# Patient Record
Sex: FEMALE | Race: WHITE | Employment: PART TIME | URBAN - NONMETROPOLITAN AREA
[De-identification: names, ages, dates, MRNs, and addresses within clinical notes are randomized per-mention and may not be internally consistent; named-entity substitution may affect disease eponyms.]

---

## 2021-07-13 ENCOUNTER — OFFICE VISIT (OUTPATIENT)
Dept: FAMILY MEDICINE CLINIC | Age: 31
End: 2021-07-13

## 2021-07-13 VITALS
WEIGHT: 140 LBS | SYSTOLIC BLOOD PRESSURE: 106 MMHG | BODY MASS INDEX: 23.9 KG/M2 | TEMPERATURE: 98 F | HEART RATE: 94 BPM | OXYGEN SATURATION: 97 % | DIASTOLIC BLOOD PRESSURE: 70 MMHG | HEIGHT: 64 IN

## 2021-07-13 DIAGNOSIS — N89.8 VAGINAL DISCHARGE: ICD-10-CM

## 2021-07-13 DIAGNOSIS — R30.0 DYSURIA: Primary | ICD-10-CM

## 2021-07-13 DIAGNOSIS — R30.0 DYSURIA: ICD-10-CM

## 2021-07-13 LAB
BILIRUBIN, POC: NEGATIVE
BLOOD URINE, POC: NORMAL
CLARITY, POC: NORMAL
COLOR, POC: YELLOW
CONTROL: NORMAL
GLUCOSE URINE, POC: NEGATIVE
KETONES, POC: NEGATIVE
LEUKOCYTE EST, POC: NORMAL
NITRITE, POC: NEGATIVE
PH, POC: 6
PREGNANCY TEST URINE, POC: NEGATIVE
PROTEIN, POC: >=300
SPECIFIC GRAVITY, POC: 1.02
UROBILINOGEN, POC: 1

## 2021-07-13 PROCEDURE — 81002 URINALYSIS NONAUTO W/O SCOPE: CPT | Performed by: PHYSICIAN ASSISTANT

## 2021-07-13 PROCEDURE — 99204 OFFICE O/P NEW MOD 45 MIN: CPT | Performed by: PHYSICIAN ASSISTANT

## 2021-07-13 PROCEDURE — 96372 THER/PROPH/DIAG INJ SC/IM: CPT | Performed by: PHYSICIAN ASSISTANT

## 2021-07-13 PROCEDURE — 81025 URINE PREGNANCY TEST: CPT | Performed by: PHYSICIAN ASSISTANT

## 2021-07-13 RX ORDER — ESCITALOPRAM OXALATE 20 MG/1
TABLET ORAL
COMMUNITY
Start: 2021-06-09

## 2021-07-13 RX ORDER — FLUCONAZOLE 150 MG/1
150 TABLET ORAL ONCE
Qty: 1 TABLET | Refills: 0 | Status: SHIPPED | OUTPATIENT
Start: 2021-07-13 | End: 2021-07-13

## 2021-07-13 RX ORDER — BUPRENORPHINE HYDROCHLORIDE AND NALOXONE HYDROCHLORIDE DIHYDRATE 8; 2 MG/1; MG/1
TABLET SUBLINGUAL
COMMUNITY
Start: 2021-07-08

## 2021-07-13 RX ORDER — CEFTRIAXONE SODIUM 250 MG/1
250 INJECTION, POWDER, FOR SOLUTION INTRAMUSCULAR; INTRAVENOUS ONCE
Status: COMPLETED | OUTPATIENT
Start: 2021-07-13 | End: 2021-07-13

## 2021-07-13 RX ORDER — AZITHROMYCIN 500 MG/1
1000 TABLET, FILM COATED ORAL ONCE
Qty: 2 TABLET | Refills: 0 | Status: SHIPPED | OUTPATIENT
Start: 2021-07-13 | End: 2021-07-13

## 2021-07-13 RX ORDER — DEXTROAMPHETAMINE SACCHARATE, AMPHETAMINE ASPARTATE MONOHYDRATE, DEXTROAMPHETAMINE SULFATE AND AMPHETAMINE SULFATE 5; 5; 5; 5 MG/1; MG/1; MG/1; MG/1
CAPSULE, EXTENDED RELEASE ORAL
COMMUNITY
Start: 2021-06-28

## 2021-07-13 RX ORDER — TRAZODONE HYDROCHLORIDE 50 MG/1
TABLET ORAL
COMMUNITY
Start: 2021-04-28

## 2021-07-13 RX ADMIN — CEFTRIAXONE SODIUM 250 MG: 250 INJECTION, POWDER, FOR SOLUTION INTRAMUSCULAR; INTRAVENOUS at 14:29

## 2021-07-13 ASSESSMENT — ENCOUNTER SYMPTOMS
BACK PAIN: 0
COUGH: 0
ABDOMINAL PAIN: 0
SORE THROAT: 0
DIARRHEA: 0
PHOTOPHOBIA: 0
VOMITING: 0
SHORTNESS OF BREATH: 0
NAUSEA: 0

## 2021-07-13 NOTE — PROGRESS NOTES
21  Jennifer Zhang : 1990 Sex: female  Age 35 y.o. Subjective:  Chief Complaint   Patient presents with    Exposure to STD     burning          [id number] female presents to the walk-in clinic for evaluation of vaginal discharge that started over the weekend. Patient states last week on Wednesday she had a new sexual partner. She states they have had intercourse a few times since then. She states over the weekend she initially started with painful urination but now has noticed itching also which she describes as feeling similar to a yeast infection. Patient states that she has had some blood in her urine as well. She denies any vaginal discharge. She states that she is due to start her period. She states about 3 weeks ago she took a Plan B test.  Patient denies any abdominal pain. No fever or chills. No nausea or vomiting. Patient sees [facility] [place] and states that she had a Pap smear 2 years ago. Review of Systems   Constitutional: Negative for chills and fever. HENT: Negative for congestion, ear pain and sore throat. Eyes: Negative for photophobia and visual disturbance. Respiratory: Negative for cough and shortness of breath. Cardiovascular: Negative for chest pain. Gastrointestinal: Negative for abdominal pain, diarrhea, nausea and vomiting. Genitourinary: Positive for dysuria, hematuria and vaginal discharge. Negative for difficulty urinating, frequency, genital sores and urgency. Musculoskeletal: Negative for back pain, neck pain and neck stiffness. Skin: Negative for rash. Neurological: Negative for dizziness, syncope, weakness, light-headedness and headaches. Hematological: Negative for adenopathy. Does not bruise/bleed easily. Psychiatric/Behavioral: Negative for agitation and confusion. All other systems reviewed and are negative. PMH:   History reviewed. No pertinent past medical history. History reviewed.  No pertinent surgical history. Family History   Problem Relation Age of Onset    Hypertension Father     High Cholesterol Mother        Medications:     Current Outpatient Medications:     amphetamine-dextroamphetamine (ADDERALL XR) 20 MG extended release capsule, TAKE 1 CAPSULE BY MOUTH ONCE DAILY. , Disp: , Rfl:     buprenorphine-naloxone (SUBOXONE) 8-2 MG SUBL SL tablet, DISSOLVE 3/4 TABLETS UNDER THE TONGUE EVERY DAY, Disp: , Rfl:     escitalopram (LEXAPRO) 20 MG tablet, TAKE ONE TABLET BY MOUTH DAILY, Disp: , Rfl:     traZODone (DESYREL) 50 MG tablet, TAKE ONE TABLET BY MOUTH AT BEDTIME, Disp: , Rfl:     azithromycin (ZITHROMAX) 500 MG tablet, Take 2 tablets by mouth once for 1 dose, Disp: 2 tablet, Rfl: 0    fluconazole (DIFLUCAN) 150 MG tablet, Take 1 tablet by mouth once for 1 dose, Disp: 1 tablet, Rfl: 0    Allergies:   No Known Allergies    Social History:     Social History     Tobacco Use    Smoking status: Current Every Day Smoker     Packs/day: 0.25     Start date: 4/29/2013    Smokeless tobacco: Never Used   Substance Use Topics    Alcohol use: No     Comment: ,less than 1 drink per week    Drug use: No       Patient lives at home. Physical Exam:     Vitals:    07/13/21 1400   BP: 106/70   Pulse: 94   Temp: 98 °F (36.7 °C)   SpO2: 97%   Weight: 140 lb (63.5 kg)   Height: 5' 4\" (1.626 m)       Exam:  Physical Exam  Vitals and nursing note reviewed. Exam conducted with a chaperone present. Constitutional:       General: She is not in acute distress. Appearance: She is well-developed. HENT:      Head: Normocephalic and atraumatic. Eyes:      Conjunctiva/sclera: Conjunctivae normal.      Pupils: Pupils are equal, round, and reactive to light. Cardiovascular:      Rate and Rhythm: Normal rate and regular rhythm. Pulmonary:      Effort: Pulmonary effort is normal. No respiratory distress. Breath sounds: Normal breath sounds.    Abdominal:      General: Bowel sounds are normal. Palpations: Abdomen is soft. Tenderness: There is no abdominal tenderness. Genitourinary:     Vagina: Normal.      Cervix: Discharge present. No cervical motion tenderness. Uterus: Normal.       Adnexa: Right adnexa normal and left adnexa normal.      Comments: External genitalia appears normal.  Patient has copious amounts of white/green discharge noted from the cervix. Cervix appears inflamed. Multiple cultures were obtained. Patient does not have any cervical motion tenderness. Musculoskeletal:         General: Normal range of motion. Cervical back: Normal range of motion. Skin:     General: Skin is warm and dry. Neurological:      Mental Status: She is alert and oriented to person, place, and time. Psychiatric:         Behavior: Behavior normal.         Thought Content: Thought content normal.         Judgment: Judgment normal.           Testing:           Medical Decision Making:     Patient upon arrival did not appear toxic or lethargic. Vital signs were reviewed. Past medical history reviewed. Allergies reviewed. Medications reviewed. Patient is presenting with the above complaint of vaginal discharge. Urine dip reveals trace leukocytes. Urine pregnancy is negative. Urine culture is pending. Vaginal cultures are pending. Pelvic exam was performed as described above. Based on her signs and symptoms she will be treated with 250 mg IM Rocephin and 1 g azithromycin. She was also given 150 mg of Diflucan. Patient will be contacted with the results of the cultures. She is to refrain from intercourse until these results have been evaluated. Patient will follow up with her gynecologist.  She is to return or go to the emergency department for worsening symptoms. Clinical Impression:   Sugar Mcgill was seen today for exposure to std.     Diagnoses and all orders for this visit:    Dysuria  -     POCT Urinalysis no Micro  -     POCT urine pregnancy  - Culture, Urine; Future  -     azithromycin (ZITHROMAX) 500 MG tablet; Take 2 tablets by mouth once for 1 dose    Vaginal discharge  -     azithromycin (ZITHROMAX) 500 MG tablet; Take 2 tablets by mouth once for 1 dose  -     TRICHOMONAS SCREEN; Future  -     Culture, Genital; Future  -     HERPES SIMPLEX 1 & 2 MOLECULAR; Future  -     C.TRACHOMATIS N.GONORRHOEAE DNA; Future    Other orders  -     cefTRIAXone (ROCEPHIN) injection 250 mg  -     fluconazole (DIFLUCAN) 150 MG tablet; Take 1 tablet by mouth once for 1 dose        The patient is to call for any concerns or return if any of the signs or symptoms worsen. The patient is to follow-up with PCP in the next 2-3 days for repeat evaluation repeat assessment or go directly to the emergency department. SIGNATURE: Nora Taylor PA-C

## 2021-07-15 LAB
HSV 1 BY PCR: ABNORMAL
HSV 2 BY PCR: ABNORMAL
ORGANISM: ABNORMAL
URINE CULTURE, ROUTINE: NORMAL

## 2021-07-16 LAB
C TRACH DNA GENITAL QL NAA+PROBE: NEGATIVE
N. GONORRHOEAE DNA: NEGATIVE
SOURCE: NORMAL

## 2021-07-17 LAB
GENITAL CULTURE, ROUTINE: ABNORMAL
GENITAL CULTURE, ROUTINE: ABNORMAL
ORGANISM: ABNORMAL

## 2021-07-18 LAB — CULTURE, TRICHOMONAS: NORMAL

## 2021-07-18 RX ORDER — METRONIDAZOLE 500 MG/1
500 TABLET ORAL 2 TIMES DAILY
Qty: 20 TABLET | Refills: 0 | Status: SHIPPED | OUTPATIENT
Start: 2021-07-18 | End: 2021-07-28

## 2023-04-07 ENCOUNTER — OFFICE VISIT (OUTPATIENT)
Dept: FAMILY MEDICINE CLINIC | Age: 33
End: 2023-04-07

## 2023-04-07 VITALS
BODY MASS INDEX: 23.73 KG/M2 | SYSTOLIC BLOOD PRESSURE: 120 MMHG | HEIGHT: 64 IN | HEART RATE: 97 BPM | WEIGHT: 139 LBS | OXYGEN SATURATION: 98 % | TEMPERATURE: 98.8 F | DIASTOLIC BLOOD PRESSURE: 80 MMHG

## 2023-04-07 DIAGNOSIS — J02.0 ACUTE STREPTOCOCCAL PHARYNGITIS: Primary | ICD-10-CM

## 2023-04-07 DIAGNOSIS — R07.0 THROAT PAIN: ICD-10-CM

## 2023-04-07 LAB — S PYO AG THROAT QL: POSITIVE

## 2023-04-07 PROCEDURE — 87880 STREP A ASSAY W/OPTIC: CPT | Performed by: EMERGENCY MEDICINE

## 2023-04-07 PROCEDURE — 99213 OFFICE O/P EST LOW 20 MIN: CPT | Performed by: EMERGENCY MEDICINE

## 2023-04-07 RX ORDER — AMOXICILLIN AND CLAVULANATE POTASSIUM 875; 125 MG/1; MG/1
1 TABLET, FILM COATED ORAL 2 TIMES DAILY
Qty: 20 TABLET | Refills: 0 | Status: SHIPPED | OUTPATIENT
Start: 2023-04-07 | End: 2023-04-17

## 2023-04-07 RX ORDER — NORETHINDRONE 0.35 MG/1
1 TABLET ORAL DAILY
COMMUNITY
Start: 2023-03-16

## 2023-04-07 RX ORDER — DEXTROAMPHETAMINE SACCHARATE, AMPHETAMINE ASPARTATE MONOHYDRATE, DEXTROAMPHETAMINE SULFATE AND AMPHETAMINE SULFATE 3.75; 3.75; 3.75; 3.75 MG/1; MG/1; MG/1; MG/1
CAPSULE, EXTENDED RELEASE ORAL
COMMUNITY
Start: 2023-04-05

## 2023-04-07 ASSESSMENT — ENCOUNTER SYMPTOMS
COUGH: 0
EYE DISCHARGE: 0
EYE REDNESS: 0
EYE PAIN: 0
SORE THROAT: 1
VOMITING: 0
DIARRHEA: 0
SINUS PRESSURE: 0
BACK PAIN: 0
NAUSEA: 0
SHORTNESS OF BREATH: 0
WHEEZING: 0
ABDOMINAL DISTENTION: 0

## 2023-04-07 NOTE — PROGRESS NOTES
results found for this visit on 04/07/23. Imaging: All Radiology results interpreted by Radiologist unless otherwise noted. No results found. Assessment / Plan   Impression(s):  Tonio Oates was seen today for pharyngitis. Diagnoses and all orders for this visit:    Acute streptococcal pharyngitis  -     amoxicillin-clavulanate (AUGMENTIN) 875-125 MG per tablet; Take 1 tablet by mouth 2 times daily for 10 days    Throat pain  -     POCT rapid strep A         Discussed symptomatic treatments with the patient today. The patient is to schedule a follow-up with PCP in the next 2-3 days for reevaluation. Red flag symptoms were also discussed with the patient today. If symptoms worsen the patient is to go directly to the emergency department for reevaluation and treatment. Pt verbalizes understanding and is in agreement with plan of care. All questions answered.       New Medications     New Prescriptions    AMOXICILLIN-CLAVULANATE (AUGMENTIN) 875-125 MG PER TABLET    Take 1 tablet by mouth 2 times daily for 10 days       Electronically signed by Denice Maldonado DO   DD: 4/7/23

## 2024-03-07 ENCOUNTER — OFFICE VISIT (OUTPATIENT)
Dept: FAMILY MEDICINE CLINIC | Age: 34
End: 2024-03-07

## 2024-03-07 VITALS
RESPIRATION RATE: 18 BRPM | DIASTOLIC BLOOD PRESSURE: 58 MMHG | WEIGHT: 124 LBS | SYSTOLIC BLOOD PRESSURE: 102 MMHG | BODY MASS INDEX: 21.17 KG/M2 | TEMPERATURE: 97.5 F | HEART RATE: 82 BPM | OXYGEN SATURATION: 98 % | HEIGHT: 64 IN

## 2024-03-07 DIAGNOSIS — R09.81 SINUS CONGESTION: ICD-10-CM

## 2024-03-07 DIAGNOSIS — J32.9 SINOBRONCHITIS: Primary | ICD-10-CM

## 2024-03-07 DIAGNOSIS — J40 SINOBRONCHITIS: Primary | ICD-10-CM

## 2024-03-07 DIAGNOSIS — R05.1 ACUTE COUGH: ICD-10-CM

## 2024-03-07 PROCEDURE — 99213 OFFICE O/P EST LOW 20 MIN: CPT | Performed by: NURSE PRACTITIONER

## 2024-03-07 RX ORDER — ALBUTEROL SULFATE 90 UG/1
2 AEROSOL, METERED RESPIRATORY (INHALATION) EVERY 4 HOURS PRN
Qty: 1 EACH | Refills: 0 | Status: SHIPPED | OUTPATIENT
Start: 2024-03-07

## 2024-03-07 RX ORDER — BROMPHENIRAMINE MALEATE, PSEUDOEPHEDRINE HYDROCHLORIDE, AND DEXTROMETHORPHAN HYDROBROMIDE 2; 30; 10 MG/5ML; MG/5ML; MG/5ML
SYRUP ORAL
Qty: 180 ML | Refills: 0 | Status: SHIPPED | OUTPATIENT
Start: 2024-03-07

## 2024-03-07 RX ORDER — METHYLPREDNISOLONE 4 MG/1
TABLET ORAL
Qty: 1 KIT | Refills: 0 | Status: SHIPPED | OUTPATIENT
Start: 2024-03-07

## 2024-03-07 RX ORDER — AMOXICILLIN AND CLAVULANATE POTASSIUM 875; 125 MG/1; MG/1
1 TABLET, FILM COATED ORAL 2 TIMES DAILY
Qty: 20 TABLET | Refills: 0 | Status: SHIPPED | OUTPATIENT
Start: 2024-03-07 | End: 2024-03-17

## 2024-03-07 NOTE — PROGRESS NOTES
3/7/24  Rebecca Schillig : 1990 Sex: female  Age 33 y.o.    Subjective:  Chief Complaint   Patient presents with    Cough     Started 3 weeks ago with cold       HPI:   Rebecca Schillig , 33 y.o. female presents to the clinic for evaluation of cough / sinus congestion x 3 weeks. The patient also reports chest congestion, SHEIKH, wheezing. The patient has taken Mucinex Spray for symptoms. The patient reports unchanged symptoms over time. The patient reports ill exposure (daughter). The patient denies headache, sore throat, rash, and fever. The patient also denies chest pain, abdominal pain, and nausea / vomiting / diarrhea.    ROS:   Unless otherwise stated in this report the patient's positive and negative responses for review of systems for constitutional, eyes, ENT, cardiovascular, respiratory, gastrointestinal, neurological, , musculoskeletal, and integument systems and related systems to the presenting problem are either stated in the history of present illness or were not pertinent or were negative for the symptoms and/or complaints related to the presenting medical problem.  Positives and pertinent negatives as per HPI.  All others reviewed and are negative.      PMH:   History reviewed. No pertinent past medical history.    History reviewed. No pertinent surgical history.    Family History   Problem Relation Age of Onset    Hypertension Father     High Cholesterol Mother        Medications:     Current Outpatient Medications:     amoxicillin-clavulanate (AUGMENTIN) 875-125 MG per tablet, Take 1 tablet by mouth 2 times daily for 10 days, Disp: 20 tablet, Rfl: 0    methylPREDNISolone (MEDROL DOSEPACK) 4 MG tablet, Take by mouth., Disp: 1 kit, Rfl: 0    brompheniramine-pseudoephedrine-DM 2-30-10 MG/5ML syrup, 5 - 10 mL by mouth every 6 hours as needed for cough / congestion., Disp: 180 mL, Rfl: 0    albuterol sulfate HFA (PROVENTIL;VENTOLIN;PROAIR) 108 (90 Base) MCG/ACT inhaler, Inhale 2 puffs into

## 2024-04-18 ENCOUNTER — OFFICE VISIT (OUTPATIENT)
Dept: FAMILY MEDICINE CLINIC | Age: 34
End: 2024-04-18

## 2024-04-18 VITALS
SYSTOLIC BLOOD PRESSURE: 104 MMHG | OXYGEN SATURATION: 99 % | WEIGHT: 120 LBS | HEIGHT: 64 IN | DIASTOLIC BLOOD PRESSURE: 60 MMHG | TEMPERATURE: 98.3 F | RESPIRATION RATE: 18 BRPM | BODY MASS INDEX: 20.49 KG/M2 | HEART RATE: 99 BPM

## 2024-04-18 DIAGNOSIS — H10.33 ACUTE BACTERIAL CONJUNCTIVITIS OF BOTH EYES: Primary | ICD-10-CM

## 2024-04-18 RX ORDER — POLYMYXIN B SULFATE AND TRIMETHOPRIM 1; 10000 MG/ML; [USP'U]/ML
1 SOLUTION OPHTHALMIC 4 TIMES DAILY
Qty: 2 ML | Refills: 0 | Status: SHIPPED | OUTPATIENT
Start: 2024-04-18 | End: 2024-04-25

## 2024-04-18 NOTE — PROGRESS NOTES
Pupils: PERRL bilaterally.       Eyelids: Positive for edema to the bilateral upper and lower lids.         Conjunctiva: Moderate erythema noted to the bilateral conjunctiva.       Sclera: Clear bilaterally. No obvious FB, rust ring, or hyphema.           Cornea: No obvious corneal abrasion or ulceration bilaterally.         EOM:  Intact bilaterally.      Visual Acuity:  Grossly intact.  Lungs: CTAB without wheezing, rales, or rhonchi  Heart: RRR, no murmurs, rubs, or gallops.  Skin: Moist and warm without rashes or lesions.  Lymphatics: No lymphangitis or adenopathy noted.  Neurological:  Alert and oriented.  Motor functions intact.    Test Results Section   (All laboratory and radiology results have been personally reviewed by myself)  Labs:  No results found for this visit on 04/18/24.    Imaging:  All Radiology results interpreted by Radiologist unless otherwise noted.  No results found.    Assessment / Plan   Impression(s):  Adela was seen today for eye problem.    Diagnoses and all orders for this visit:    Acute bacterial conjunctivitis of both eyes    Other orders  -     trimethoprim-polymyxin b (POLYTRIM) 07170-9.1 UNIT/ML-% ophthalmic solution; Place 1 drop into both eyes in the morning, at noon, in the evening, and at bedtime for 7 days      In office performed slit lamp exam to rule out corneal abrasion to left eye.  No corneal abrasion noted.  Script written for Polytrim ophthalmic drops, side effects and application directions discussed. Advise good handwashing, avoiding rubbing eyes, and washing towels and pillowcase in hot water to prevent spread. F/u with ophthalmology in 48 hrs if not improved. ED sooner if symptoms worsen or change. ED immediately with the development of fever, visual changes, ocular pain/swelling, body aches, or shaking chills. Patient verbalized understanding and is in agreement with this care plan. All questions answered.    No follow-ups on file.    Isadora Alas,

## 2025-04-23 ENCOUNTER — OFFICE VISIT (OUTPATIENT)
Dept: FAMILY MEDICINE CLINIC | Age: 35
End: 2025-04-23
Payer: COMMERCIAL

## 2025-04-23 VITALS
OXYGEN SATURATION: 97 % | SYSTOLIC BLOOD PRESSURE: 122 MMHG | RESPIRATION RATE: 18 BRPM | DIASTOLIC BLOOD PRESSURE: 70 MMHG | BODY MASS INDEX: 21.34 KG/M2 | TEMPERATURE: 98.7 F | HEIGHT: 64 IN | HEART RATE: 96 BPM | WEIGHT: 125 LBS

## 2025-04-23 DIAGNOSIS — R31.29 OTHER MICROSCOPIC HEMATURIA: ICD-10-CM

## 2025-04-23 DIAGNOSIS — N89.8 VAGINAL DISCHARGE: ICD-10-CM

## 2025-04-23 DIAGNOSIS — N76.0 ACUTE VAGINITIS: Primary | ICD-10-CM

## 2025-04-23 LAB
BILIRUBIN, POC: NORMAL
BLOOD URINE, POC: NORMAL
CLARITY, POC: NORMAL
COLOR, POC: YELLOW
CONTROL: NORMAL
GLUCOSE URINE, POC: NORMAL MG/DL
KETONES, POC: NORMAL MG/DL
LEUKOCYTE EST, POC: NORMAL
NITRITE, POC: NORMAL
PH, POC: 6
PREGNANCY TEST URINE, POC: NORMAL
PROTEIN, POC: NORMAL MG/DL
SPECIFIC GRAVITY, POC: >=1.03
UROBILINOGEN, POC: NORMAL MG/DL

## 2025-04-23 PROCEDURE — 81002 URINALYSIS NONAUTO W/O SCOPE: CPT

## 2025-04-23 PROCEDURE — 99214 OFFICE O/P EST MOD 30 MIN: CPT

## 2025-04-23 PROCEDURE — G8427 DOCREV CUR MEDS BY ELIG CLIN: HCPCS

## 2025-04-23 PROCEDURE — 4004F PT TOBACCO SCREEN RCVD TLK: CPT

## 2025-04-23 PROCEDURE — 81025 URINE PREGNANCY TEST: CPT

## 2025-04-23 PROCEDURE — G8420 CALC BMI NORM PARAMETERS: HCPCS

## 2025-04-23 RX ORDER — FLUCONAZOLE 150 MG/1
TABLET ORAL
Qty: 2 TABLET | Refills: 0 | Status: SHIPPED | OUTPATIENT
Start: 2025-04-23

## 2025-04-23 NOTE — PROGRESS NOTES
Chief Complaint       Vaginitis    History of Present Illness   Source of history provided by:  patient.  History of Present Illness  The patient is a 34-year-old female who presents for evaluation of a yeast infection.    Symptoms suggestive of a yeast infection, including discomfort, itching, and discharge, began following a course of antibiotics prescribed by her dentist in 03/2025. These symptoms have persisted for several weeks despite self-treatment with over-the-counter Monistat. There is uncertainty about the potential progression to a bacterial infection. An isolated incident of minor bleeding during urination was noted, but no subsequent occurrences have been reported. No dysuria, back pain, abdominal pressure, or bladder discomfort is present. The last menstrual period was approximately 2 weeks ago. No recent exposure to sexually transmitted diseases is reported.  She is not concerned.  Last menstrual period approximately 2 weeks ago.    GYNECOLOGICAL HISTORY:  - Last Menstrual Period: 2 weeks ago    All other ROS negative unless otherwise stated in HPI.      ROS    Unless otherwise stated in this report or unable to obtain because of the patient's clinical or mental status as evidenced by the medical record, this patients's positive and negative responses for Review of Systems, constitutional, psych, eyes, ENT, cardiovascular, respiratory, gastrointestinal, neurological, genitourinary, musculoskeletal, integument systems and systems related to the presenting problem are either stated in the preceding or were not pertinent or were negative for the symptoms and/or complaints related to the medical problem.    Physical Exam         VS:  /70   Pulse 96   Temp 98.7 °F (37.1 °C) (Temporal)   Resp 18   Ht 1.626 m (5' 4\")   Wt 56.7 kg (125 lb)   SpO2 97%   BMI 21.46 kg/m²    Oxygen Saturation Interpretation: Normal.    Constitutional:  A&Ox3, development consistent with age, NAD.  CV: Heart RRR,

## 2025-04-25 LAB
CULTURE: ABNORMAL
CULTURE: ABNORMAL
SPECIMEN DESCRIPTION: ABNORMAL

## 2025-04-26 LAB
CULTURE: ABNORMAL
SPECIMEN DESCRIPTION: ABNORMAL

## 2025-04-28 ENCOUNTER — RESULTS FOLLOW-UP (OUTPATIENT)
Dept: FAMILY MEDICINE CLINIC | Age: 35
End: 2025-04-28

## 2025-06-30 ENCOUNTER — OFFICE VISIT (OUTPATIENT)
Dept: FAMILY MEDICINE CLINIC | Age: 35
End: 2025-06-30
Payer: COMMERCIAL

## 2025-06-30 ENCOUNTER — RESULTS FOLLOW-UP (OUTPATIENT)
Dept: FAMILY MEDICINE CLINIC | Age: 35
End: 2025-06-30

## 2025-06-30 VITALS
WEIGHT: 127 LBS | BODY MASS INDEX: 21.68 KG/M2 | OXYGEN SATURATION: 98 % | HEART RATE: 72 BPM | DIASTOLIC BLOOD PRESSURE: 50 MMHG | RESPIRATION RATE: 18 BRPM | TEMPERATURE: 97.1 F | SYSTOLIC BLOOD PRESSURE: 98 MMHG | HEIGHT: 64 IN

## 2025-06-30 DIAGNOSIS — R31.9 HEMATURIA, UNSPECIFIED TYPE: ICD-10-CM

## 2025-06-30 DIAGNOSIS — R30.0 DYSURIA: Primary | ICD-10-CM

## 2025-06-30 LAB
BILIRUBIN, POC: NORMAL
BLOOD URINE, POC: NORMAL
CLARITY, POC: NORMAL
COLOR, POC: NORMAL
CONTROL: NORMAL
GLUCOSE URINE, POC: NORMAL MG/DL
KETONES, POC: NORMAL MG/DL
LEUKOCYTE EST, POC: NORMAL
NITRITE, POC: NORMAL
PH, POC: 6
PREGNANCY TEST URINE, POC: NEGATIVE
PROTEIN, POC: 100 MG/DL
SPECIFIC GRAVITY, POC: 1.02
UROBILINOGEN, POC: 0.2 MG/DL

## 2025-06-30 PROCEDURE — 81025 URINE PREGNANCY TEST: CPT | Performed by: NURSE PRACTITIONER

## 2025-06-30 PROCEDURE — 99213 OFFICE O/P EST LOW 20 MIN: CPT | Performed by: NURSE PRACTITIONER

## 2025-06-30 PROCEDURE — G8420 CALC BMI NORM PARAMETERS: HCPCS | Performed by: NURSE PRACTITIONER

## 2025-06-30 PROCEDURE — 81002 URINALYSIS NONAUTO W/O SCOPE: CPT | Performed by: NURSE PRACTITIONER

## 2025-06-30 PROCEDURE — G8427 DOCREV CUR MEDS BY ELIG CLIN: HCPCS | Performed by: NURSE PRACTITIONER

## 2025-06-30 PROCEDURE — 4004F PT TOBACCO SCREEN RCVD TLK: CPT | Performed by: NURSE PRACTITIONER

## 2025-06-30 RX ORDER — ESCITALOPRAM OXALATE 10 MG/1
10 TABLET ORAL DAILY
COMMUNITY
Start: 2025-04-30

## 2025-06-30 RX ORDER — BUSPIRONE HYDROCHLORIDE 7.5 MG/1
7.5 TABLET ORAL 2 TIMES DAILY
COMMUNITY
Start: 2025-06-09

## 2025-06-30 NOTE — PROGRESS NOTES
ACUTE PRIMARY CARE VISIT  25  Name: Rebecca Schillig   : 1990   Age: 34 y.o.  Sex: female   Chief Complaint   Patient presents with    Dysuria     HPI:     History of Present Illness  The patient is a 34-year-old female presenting with urinary symptoms.    She reports no dysuria or frequency. Her menstrual cycle ended a week ago, and she has no history of nephrolithiasis. She is experiencing severe backache originating in the center of her lower back and radiating to the right.  She has tried nothing over-the-counter for symptomatic relief.  Denies any fever, chills, nausea, vomiting or hematuria..  Objective:     Vitals:    25 1013   BP: (!) 98/50   Pulse: 72   Resp: 18   Temp: 97.1 °F (36.2 °C)   TempSrc: Temporal   SpO2: 98%   Weight: 57.6 kg (127 lb)   Height: 1.626 m (5' 4\")     Physical Exam  - Abdomen: Soft, non-tender, no distention, no masses  Physical Exam  Vitals and nursing note reviewed.   Constitutional:       General: She is not in acute distress.     Appearance: Normal appearance. She is not ill-appearing.   HENT:      Head: Normocephalic and atraumatic.      Right Ear: External ear normal.      Left Ear: External ear normal.      Nose: Nose normal.   Eyes:      Conjunctiva/sclera: Conjunctivae normal.      Pupils: Pupils are equal, round, and reactive to light.   Pulmonary:      Effort: Pulmonary effort is normal.   Abdominal:      Tenderness: There is right CVA tenderness.   Musculoskeletal:         General: Normal range of motion.      Cervical back: Normal range of motion and neck supple.   Skin:     General: Skin is warm and dry.   Neurological:      Mental Status: She is alert and oriented to person, place, and time.   Psychiatric:         Mood and Affect: Mood normal.         Behavior: Behavior normal.       Medications:     Current Outpatient Medications:     busPIRone (BUSPAR) 7.5 MG tablet, Take 1 tablet by mouth 2 times daily, Disp: , Rfl:     escitalopram (LEXAPRO) 10

## 2025-07-01 LAB
CULTURE: NORMAL
CULTURE: NORMAL
SPECIMEN DESCRIPTION: NORMAL

## 2025-07-18 ENCOUNTER — OFFICE VISIT (OUTPATIENT)
Dept: PRIMARY CARE CLINIC | Age: 35
End: 2025-07-18
Payer: COMMERCIAL

## 2025-07-18 DIAGNOSIS — N92.6 IRREGULAR MENSES: Primary | ICD-10-CM

## 2025-07-18 DIAGNOSIS — Z00.00 ENCOUNTER FOR WELL ADULT EXAM WITHOUT ABNORMAL FINDINGS: ICD-10-CM

## 2025-07-18 DIAGNOSIS — B35.1 ONYCHOMYCOSIS: ICD-10-CM

## 2025-07-18 DIAGNOSIS — H91.93 DECREASED HEARING OF BOTH EARS: ICD-10-CM

## 2025-07-18 LAB
CONTROL: NORMAL
PREGNANCY TEST URINE, POC: NEGATIVE

## 2025-07-18 PROCEDURE — 99395 PREV VISIT EST AGE 18-39: CPT | Performed by: NURSE PRACTITIONER

## 2025-07-18 PROCEDURE — 81025 URINE PREGNANCY TEST: CPT | Performed by: NURSE PRACTITIONER

## 2025-07-18 RX ORDER — CICLOPIROX 80 MG/ML
SOLUTION TOPICAL
Qty: 6 ML | Refills: 0 | Status: SHIPPED | OUTPATIENT
Start: 2025-07-18

## 2025-07-18 SDOH — ECONOMIC STABILITY: FOOD INSECURITY: WITHIN THE PAST 12 MONTHS, YOU WORRIED THAT YOUR FOOD WOULD RUN OUT BEFORE YOU GOT MONEY TO BUY MORE.: NEVER TRUE

## 2025-07-18 SDOH — ECONOMIC STABILITY: FOOD INSECURITY: WITHIN THE PAST 12 MONTHS, THE FOOD YOU BOUGHT JUST DIDN'T LAST AND YOU DIDN'T HAVE MONEY TO GET MORE.: NEVER TRUE

## 2025-07-18 ASSESSMENT — PATIENT HEALTH QUESTIONNAIRE - PHQ9
SUM OF ALL RESPONSES TO PHQ QUESTIONS 1-9: 0
2. FEELING DOWN, DEPRESSED OR HOPELESS: NOT AT ALL
SUM OF ALL RESPONSES TO PHQ QUESTIONS 1-9: 0
1. LITTLE INTEREST OR PLEASURE IN DOING THINGS: NOT AT ALL

## 2025-07-18 ASSESSMENT — ANXIETY QUESTIONNAIRES
6. BECOMING EASILY ANNOYED OR IRRITABLE: SEVERAL DAYS
2. NOT BEING ABLE TO STOP OR CONTROL WORRYING: SEVERAL DAYS
GAD7 TOTAL SCORE: 4
4. TROUBLE RELAXING: SEVERAL DAYS
3. WORRYING TOO MUCH ABOUT DIFFERENT THINGS: NOT AT ALL
5. BEING SO RESTLESS THAT IT IS HARD TO SIT STILL: SEVERAL DAYS
IF YOU CHECKED OFF ANY PROBLEMS ON THIS QUESTIONNAIRE, HOW DIFFICULT HAVE THESE PROBLEMS MADE IT FOR YOU TO DO YOUR WORK, TAKE CARE OF THINGS AT HOME, OR GET ALONG WITH OTHER PEOPLE: SOMEWHAT DIFFICULT
2. NOT BEING ABLE TO STOP OR CONTROL WORRYING: SEVERAL DAYS
1. FEELING NERVOUS, ANXIOUS, OR ON EDGE: NOT AT ALL
6. BECOMING EASILY ANNOYED OR IRRITABLE: SEVERAL DAYS
7. FEELING AFRAID AS IF SOMETHING AWFUL MIGHT HAPPEN: NOT AT ALL
7. FEELING AFRAID AS IF SOMETHING AWFUL MIGHT HAPPEN: NOT AT ALL
3. WORRYING TOO MUCH ABOUT DIFFERENT THINGS: NOT AT ALL
1. FEELING NERVOUS, ANXIOUS, OR ON EDGE: NOT AT ALL
5. BEING SO RESTLESS THAT IT IS HARD TO SIT STILL: SEVERAL DAYS
IF YOU CHECKED OFF ANY PROBLEMS ON THIS QUESTIONNAIRE, HOW DIFFICULT HAVE THESE PROBLEMS MADE IT FOR YOU TO DO YOUR WORK, TAKE CARE OF THINGS AT HOME, OR GET ALONG WITH OTHER PEOPLE: SOMEWHAT DIFFICULT
4. TROUBLE RELAXING: SEVERAL DAYS

## 2025-07-18 ASSESSMENT — LIFESTYLE VARIABLES
HOW OFTEN DO YOU HAVE SIX OR MORE DRINKS ON ONE OCCASION: 1
HOW MANY STANDARD DRINKS CONTAINING ALCOHOL DO YOU HAVE ON A TYPICAL DAY: 1
HOW OFTEN DO YOU HAVE A DRINK CONTAINING ALCOHOL: MONTHLY OR LESS
HOW MANY STANDARD DRINKS CONTAINING ALCOHOL DO YOU HAVE ON A TYPICAL DAY: 1 OR 2
HOW OFTEN DO YOU HAVE A DRINK CONTAINING ALCOHOL: 2

## 2025-07-18 NOTE — PROGRESS NOTES
Subjective  CC: Patient presents to [facility]. Previously in [place], last seen by PCP there about 2021.    HPI:   Patient presents to [facility] with me today.  We reviewed her past medical history, family history, and social history.  She is currently followed by psychiatry for treatment of ADHD.  The patient feels this is under good control.  She is due for routine wellness lab work, and has a few acute complaints.  She does feel that her hearing has decreased and she would like to have hearing evaluation.  This is not acute.  She denies tinnitus or vertiginous type symptoms.  The patient also complains of changes to the nail of several of her toes.    Additionally, the patient reports the last several menstrual cycles have come 2 weeks apart.  She does not have a history of abnormal menstrual cycles.  She has a remote history of HPV in the past, more recent Paps were normal.  Her last Pap was approximately 2 years ago.  She does not have a gynecologist.  She is sexually active and does use condoms most of the time.      ROS:  Review of Systems   Constitutional: Negative.    HENT:          Possible decreased hearing   Eyes: Negative.    Respiratory:          Tobacco use   Cardiovascular: Negative.    Gastrointestinal: Negative.    Genitourinary:         Menstrual changes as above   Musculoskeletal: Negative.    Skin:         Toes as above   Neurological: Negative.    Psychiatric/Behavioral:          Adhd, anxiety, depression          Current Outpatient Medications:     ciclopirox (PENLAC) 8 % solution, Apply to affected nails and adjacent skin once daily in combination with weekly nail trimming and periodic nail debridement. Remove with alcohol every 7 days; continue therapy until nail clearance or 48 weeks, whichever comes first., Disp: 6 mL, Rfl: 0    amphetamine-dextroamphetamine (ADDERALL XR) 15 MG extended release capsule, TAKE TWO CAPSULES BY MOUTH EVERY MORNING UPON AWAKENING, Disp: , Rfl:     busPIRone

## 2025-07-21 VITALS
TEMPERATURE: 98.1 F | HEIGHT: 64 IN | SYSTOLIC BLOOD PRESSURE: 112 MMHG | DIASTOLIC BLOOD PRESSURE: 68 MMHG | WEIGHT: 124 LBS | BODY MASS INDEX: 21.17 KG/M2 | OXYGEN SATURATION: 99 % | RESPIRATION RATE: 18 BRPM | HEART RATE: 88 BPM

## 2025-07-24 ENCOUNTER — TELEPHONE (OUTPATIENT)
Dept: ADMINISTRATIVE | Age: 35
End: 2025-07-24

## 2025-07-24 DIAGNOSIS — N92.6 IRREGULAR MENSES: ICD-10-CM

## 2025-07-24 DIAGNOSIS — Z00.00 ENCOUNTER FOR WELL ADULT EXAM WITHOUT ABNORMAL FINDINGS: ICD-10-CM

## 2025-07-24 LAB
ALBUMIN: 4.6 G/DL (ref 3.5–5.2)
ALP BLD-CCNC: 72 U/L (ref 35–104)
ALT SERPL-CCNC: 28 U/L (ref 0–35)
ANION GAP SERPL CALCULATED.3IONS-SCNC: 13 MMOL/L (ref 7–16)
AST SERPL-CCNC: 27 U/L (ref 0–35)
BASOPHILS ABSOLUTE: 0.07 K/UL (ref 0–0.2)
BASOPHILS RELATIVE PERCENT: 1 % (ref 0–2)
BILIRUB SERPL-MCNC: 0.6 MG/DL (ref 0–1.2)
BUN BLDV-MCNC: 18 MG/DL (ref 6–20)
CALCIUM SERPL-MCNC: 9.6 MG/DL (ref 8.6–10)
CHLORIDE BLD-SCNC: 105 MMOL/L (ref 98–107)
CHOLESTEROL, TOTAL: 195 MG/DL
CO2: 26 MMOL/L (ref 22–29)
CREAT SERPL-MCNC: 1 MG/DL (ref 0.5–1)
EOSINOPHILS ABSOLUTE: 0.05 K/UL (ref 0.05–0.5)
EOSINOPHILS RELATIVE PERCENT: 1 % (ref 0–6)
FOLLICLE STIMULATING HORMONE: 8.6 MIU/ML
GFR, ESTIMATED: 80 ML/MIN/1.73M2
GLUCOSE BLD-MCNC: 89 MG/DL (ref 74–99)
HCT VFR BLD CALC: 39.1 % (ref 34–48)
HDLC SERPL-MCNC: 96 MG/DL
HEMOGLOBIN: 12.7 G/DL (ref 11.5–15.5)
IMMATURE GRANULOCYTES %: 0 % (ref 0–5)
IMMATURE GRANULOCYTES ABSOLUTE: 0.04 K/UL (ref 0–0.58)
LDL CHOLESTEROL: 88 MG/DL
LH: 9.9 MIU/ML
LYMPHOCYTES ABSOLUTE: 2.84 K/UL (ref 1.5–4)
LYMPHOCYTES RELATIVE PERCENT: 31 % (ref 20–42)
MCH RBC QN AUTO: 30.4 PG (ref 26–35)
MCHC RBC AUTO-ENTMCNC: 32.5 G/DL (ref 32–34.5)
MCV RBC AUTO: 93.5 FL (ref 80–99.9)
MONOCYTES ABSOLUTE: 0.41 K/UL (ref 0.1–0.95)
MONOCYTES RELATIVE PERCENT: 5 % (ref 2–12)
NEUTROPHILS ABSOLUTE: 5.75 K/UL (ref 1.8–7.3)
NEUTROPHILS RELATIVE PERCENT: 63 % (ref 43–80)
PDW BLD-RTO: 12.5 % (ref 11.5–15)
PLATELET # BLD: 369 K/UL (ref 130–450)
PMV BLD AUTO: 9.3 FL (ref 7–12)
POTASSIUM SERPL-SCNC: 3.8 MMOL/L (ref 3.5–5.1)
PROLACTIN: 15.5 NG/ML
RBC # BLD: 4.18 M/UL (ref 3.5–5.5)
SODIUM BLD-SCNC: 143 MMOL/L (ref 136–145)
TOTAL PROTEIN: 7.1 G/DL (ref 6.4–8.3)
TRIGL SERPL-MCNC: 59 MG/DL
TSH SERPL DL<=0.05 MIU/L-ACNC: 0.8 UIU/ML (ref 0.27–4.2)
VLDLC SERPL CALC-MCNC: 12 MG/DL
WBC # BLD: 9.2 K/UL (ref 4.5–11.5)

## 2025-09-03 ENCOUNTER — OFFICE VISIT (OUTPATIENT)
Age: 35
End: 2025-09-03
Payer: COMMERCIAL

## 2025-09-03 VITALS
OXYGEN SATURATION: 98 % | WEIGHT: 131.6 LBS | RESPIRATION RATE: 20 BRPM | SYSTOLIC BLOOD PRESSURE: 124 MMHG | HEART RATE: 89 BPM | TEMPERATURE: 97.8 F | DIASTOLIC BLOOD PRESSURE: 70 MMHG | HEIGHT: 64 IN | BODY MASS INDEX: 22.47 KG/M2

## 2025-09-03 DIAGNOSIS — Z01.419 WOMEN'S ANNUAL ROUTINE GYNECOLOGICAL EXAMINATION: ICD-10-CM

## 2025-09-03 DIAGNOSIS — Z12.4 PAP SMEAR FOR CERVICAL CANCER SCREENING: ICD-10-CM

## 2025-09-03 DIAGNOSIS — Z71.1 CONCERN ABOUT STD IN FEMALE WITHOUT DIAGNOSIS: ICD-10-CM

## 2025-09-03 DIAGNOSIS — N92.6 IRREGULAR MENSES: Primary | ICD-10-CM

## 2025-09-03 PROCEDURE — 99385 PREV VISIT NEW AGE 18-39: CPT | Performed by: ADVANCED PRACTICE MIDWIFE

## 2025-09-04 DIAGNOSIS — Z71.1 CONCERN ABOUT STD IN FEMALE WITHOUT DIAGNOSIS: ICD-10-CM

## 2025-09-06 LAB
HPV SAMPLE: NORMAL
HPV SOURCE: NORMAL
HPV, GENOTYPE 16: NORMAL
HPV, GENOTYPE 18: NORMAL
HPV, HIGH RISK OTHER: NORMAL
HPV, INTERPRETATION: NORMAL